# Patient Record
Sex: MALE | Race: BLACK OR AFRICAN AMERICAN | Employment: FULL TIME | URBAN - METROPOLITAN AREA
[De-identification: names, ages, dates, MRNs, and addresses within clinical notes are randomized per-mention and may not be internally consistent; named-entity substitution may affect disease eponyms.]

---

## 2019-09-09 ENCOUNTER — HOSPITAL ENCOUNTER (EMERGENCY)
Age: 29
Discharge: HOME | End: 2019-09-09
Attending: EMERGENCY MEDICINE
Payer: MEDICAID

## 2019-09-09 VITALS
BODY MASS INDEX: 25.29 KG/M2 | SYSTOLIC BLOOD PRESSURE: 172 MMHG | RESPIRATION RATE: 20 BRPM | TEMPERATURE: 98 F | HEART RATE: 78 BPM | OXYGEN SATURATION: 100 % | WEIGHT: 161.5 LBS | DIASTOLIC BLOOD PRESSURE: 82 MMHG

## 2019-09-09 DIAGNOSIS — T50.904A DRUG OVERDOSE, UNDETERMINED INTENT, INITIAL ENCOUNTER: Primary | ICD-10-CM

## 2019-09-09 LAB
ALBUMIN SERPL-MCNC: 4.4 G/DL (ref 3.5–5)
ALBUMIN/GLOB SERPL: 1 {RATIO} (ref 1.1–2.2)
ALP SERPL-CCNC: 81 U/L (ref 45–117)
ALT SERPL-CCNC: 80 U/L (ref 12–78)
ANION GAP SERPL CALC-SCNC: 8 MMOL/L (ref 5–15)
AST SERPL-CCNC: 101 U/L (ref 15–37)
BASOPHILS # BLD: 0 K/UL (ref 0–0.1)
BASOPHILS NFR BLD: 0 % (ref 0–1)
BILIRUB SERPL-MCNC: 0.4 MG/DL (ref 0.2–1)
BUN SERPL-MCNC: 14 MG/DL (ref 6–20)
BUN/CREAT SERPL: 10 (ref 12–20)
CALCIUM SERPL-MCNC: 9.2 MG/DL (ref 8.5–10.1)
CHLORIDE SERPL-SCNC: 101 MMOL/L (ref 97–108)
CO2 SERPL-SCNC: 30 MMOL/L (ref 21–32)
CREAT SERPL-MCNC: 1.41 MG/DL (ref 0.7–1.3)
DIFFERENTIAL METHOD BLD: ABNORMAL
EOSINOPHIL # BLD: 0 K/UL (ref 0–0.4)
EOSINOPHIL NFR BLD: 0 % (ref 0–7)
ERYTHROCYTE [DISTWIDTH] IN BLOOD BY AUTOMATED COUNT: 12.6 % (ref 11.5–14.5)
GLOBULIN SER CALC-MCNC: 4.2 G/DL (ref 2–4)
GLUCOSE SERPL-MCNC: 127 MG/DL (ref 65–100)
HCT VFR BLD AUTO: 42.8 % (ref 36.6–50.3)
HGB BLD-MCNC: 13.8 G/DL (ref 12.1–17)
IMM GRANULOCYTES # BLD AUTO: 0 K/UL (ref 0–0.04)
IMM GRANULOCYTES NFR BLD AUTO: 0 % (ref 0–0.5)
LYMPHOCYTES # BLD: 0.7 K/UL (ref 0.8–3.5)
LYMPHOCYTES NFR BLD: 6 % (ref 12–49)
MCH RBC QN AUTO: 29.6 PG (ref 26–34)
MCHC RBC AUTO-ENTMCNC: 32.2 G/DL (ref 30–36.5)
MCV RBC AUTO: 91.6 FL (ref 80–99)
MONOCYTES # BLD: 0.7 K/UL (ref 0–1)
MONOCYTES NFR BLD: 6 % (ref 5–13)
NEUTS SEG # BLD: 10.4 K/UL (ref 1.8–8)
NEUTS SEG NFR BLD: 88 % (ref 32–75)
NRBC # BLD: 0 K/UL (ref 0–0.01)
NRBC BLD-RTO: 0 PER 100 WBC
PLATELET # BLD AUTO: 172 K/UL (ref 150–400)
PMV BLD AUTO: 11.5 FL (ref 8.9–12.9)
POTASSIUM SERPL-SCNC: 3.6 MMOL/L (ref 3.5–5.1)
PROT SERPL-MCNC: 8.6 G/DL (ref 6.4–8.2)
RBC # BLD AUTO: 4.67 M/UL (ref 4.1–5.7)
RBC MORPH BLD: ABNORMAL
SODIUM SERPL-SCNC: 139 MMOL/L (ref 136–145)
TROPONIN I SERPL-MCNC: <0.05 NG/ML
WBC # BLD AUTO: 11.8 K/UL (ref 4.1–11.1)

## 2019-09-09 PROCEDURE — 99285 EMERGENCY DEPT VISIT HI MDM: CPT

## 2019-09-09 PROCEDURE — 85025 COMPLETE CBC W/AUTO DIFF WBC: CPT

## 2019-09-09 PROCEDURE — 74011250636 HC RX REV CODE- 250/636: Performed by: EMERGENCY MEDICINE

## 2019-09-09 PROCEDURE — 96374 THER/PROPH/DIAG INJ IV PUSH: CPT

## 2019-09-09 PROCEDURE — 84484 ASSAY OF TROPONIN QUANT: CPT

## 2019-09-09 PROCEDURE — 36415 COLL VENOUS BLD VENIPUNCTURE: CPT

## 2019-09-09 PROCEDURE — 80053 COMPREHEN METABOLIC PANEL: CPT

## 2019-09-09 RX ORDER — NALOXONE HYDROCHLORIDE 1 MG/ML
0.2 INJECTION INTRAMUSCULAR; INTRAVENOUS; SUBCUTANEOUS
Status: COMPLETED | OUTPATIENT
Start: 2019-09-09 | End: 2019-09-09

## 2019-09-09 RX ADMIN — NALOXONE HYDROCHLORIDE 0.2 MG: 1 INJECTION PARENTERAL at 18:55

## 2019-09-09 NOTE — ED TRIAGE NOTES
Patient presents via EMS from scene for concerns of drug overdose. Patient was found in his vehicle by his girlfriend unconscious, girlfriend pulled patient out of vehicle and called EMS. [facility] arrived on scene and provided rescue breaths with BVM prior to EMS arrival.   Upon EMS arrival patient arousable to noxious stimuli (responded to inhaled NH3). No Narcan administered en route. On arrival to ED, patient drowsy but opens eyes to tactile stimuli. Pulled several bags of substances out of his sock and attempted to ingest them. [employer] notified and patient disposed of several bags of unknown, white powdery substance and marijuana.

## 2019-09-09 NOTE — ED NOTES
Patient has eyes open spontaneously to verbal stimuli after receiving 0.2 mg Narcan. Oxygen sats 100 on RA.

## 2019-09-09 NOTE — ED PROVIDER NOTES
EMERGENCY DEPARTMENT HISTORY AND PHYSICAL EXAM      Date: 9/9/2019  Patient Name: Bulmaro Martinez    History of Presenting Illness     Chief Complaint   Patient presents with    Drug Overdose       History Provided By: EMS    HPI: Bulmaro Martinez, 26 y.o. male presents with EMS after having been found unconscious and barely breathing by his girlfriend. Patient states upon arrival EMS was able to arouse the patient but has had episodes of not being able to keep his head up and his oxygen saturations continued to drop patient denies any other history and is accompanied by police also. Patient is a poor historian    There are no other complaints, changes, or physical findings at this time. PCP: None    No current facility-administered medications on file prior to encounter. Current Outpatient Medications on File Prior to Encounter   Medication Sig Dispense Refill    ondansetron (ZOFRAN ODT) 4 mg disintegrating tablet Take 1 Tab by mouth every eight (8) hours as needed for Nausea. 10 Tab 0    dicyclomine (BENTYL) 20 mg tablet Take 1 Tab by mouth every six (6) hours as needed (abdominal cramps) for 12 doses. 12 Tab 0    famotidine (PEPCID) 20 mg tablet Take 1 Tab by mouth two (2) times a day. 20 Tab 0       Past History     Past Medical History:  History reviewed. No pertinent past medical history. Past Surgical History:  History reviewed. No pertinent surgical history. Family History:  History reviewed. No pertinent family history. Social History:  Social History     Tobacco Use    Smoking status: Current Every Day Smoker    Smokeless tobacco: Never Used    Tobacco comment: occasional black and mild   Substance Use Topics    Alcohol use:  Yes    Drug use: Yes     Types: Heroin, Cocaine, Opiates       Allergies:  No Known Allergies      Review of Systems   Review of Systems   Unable to perform ROS: Patient nonverbal       Physical Exam   Physical Exam   Constitutional: He appears well-developed and well-nourished. No distress. Extremely sleepy but arousable   HENT:   Head: Normocephalic and atraumatic. Mouth/Throat: Oropharynx is clear and moist. No oropharyngeal exudate. Patient had 2 small baggies of a white powdery substance in his mouth that was subsequently removed and disposed of by  at the bedside. Eyes: Pupils are equal, round, and reactive to light. Conjunctivae and EOM are normal. Right eye exhibits no discharge. Left eye exhibits no discharge. Neck: Normal range of motion. Neck supple. Cardiovascular: Normal rate, regular rhythm and intact distal pulses. Exam reveals no gallop and no friction rub. No murmur heard. Pulmonary/Chest: Breath sounds normal. No respiratory distress. He has no wheezes. He has no rales. He exhibits no tenderness. Decreased respiratory effort   Abdominal: Soft. Bowel sounds are normal. He exhibits no distension and no mass. There is no tenderness. There is no rebound and no guarding. Musculoskeletal: Normal range of motion. He exhibits no edema. Lymphadenopathy:     He has no cervical adenopathy. Neurological: He is alert. No cranial nerve deficit. Coordination normal.   Arousable but has trouble staying awake   Skin: Skin is warm and dry. No rash noted. No erythema. Psychiatric: He has a normal mood and affect. Nursing note and vitals reviewed. Diagnostic Study Results     Labs -   No results found for this or any previous visit (from the past 12 hour(s)). Radiologic Studies -   No orders to display     CT Results  (Last 48 hours)    None        CXR Results  (Last 48 hours)    None          Medical Decision Making   I am the first provider for this patient. I reviewed the vital signs, available nursing notes, past medical history, past surgical history, family history and social history. Vital Signs-Reviewed the patient's vital signs. No data found.     Records Reviewed: Nursing Notes    Provider Notes (Medical Decision Making): Suspect drug overdose and will treat with 0.2 of Narcan at this time    ED Course:   Initial assessment performed. The patients presenting problems have been discussed, and they are in agreement with the care plan formulated and outlined with them. I have encouraged them to ask questions as they arise throughout their visit. 10:10 PM  Patient is awake alert without oxygen support would like to be discharged  Critical Care Time:   CRITICAL CARE NOTE :    10:11 PM    IMPENDING DETERIORATION -Respiratory and CNS  ASSOCIATED RISK FACTORS - Hypoxia  MANAGEMENT- Bedside Assessment  INTERPRETATION -  ECG and Cardiac Output Measures   INTERVENTIONS - Metobolic interventions  CASE REVIEW - Nursing and Family  TREATMENT RESPONSE -Improved and Resolved  PERFORMED BY - Self    NOTES   :  I have spent 45 minutes of critical care time involved in lab review, consultations with specialist, family decision- making, bedside attention and documentation. During this entire length of time I was immediately available to the patient . Sylvia Swanson MD    Disposition:  home    PLAN:  1. Discharge Medication List as of 9/9/2019 10:15 PM      START taking these medications    Details   naloxone (NARCAN) 2 mg/actuation spry Use 1 spray intranasally, then discard. Repeat with new spray every 2 min as needed for opioid overdose symptoms, alternating nostrils. , Normal, Disp-2 Applicatorful, R-0         CONTINUE these medications which have NOT CHANGED    Details   ondansetron (ZOFRAN ODT) 4 mg disintegrating tablet Take 1 Tab by mouth every eight (8) hours as needed for Nausea. Print, 4 mg, Disp-10 Tab, R-0      dicyclomine (BENTYL) 20 mg tablet Take 1 Tab by mouth every six (6) hours as needed (abdominal cramps) for 12 doses. Print, 20 mg, Disp-12 Tab, R-0      famotidine (PEPCID) 20 mg tablet Take 1 Tab by mouth two (2) times a day. Print, 20 mg, Disp-20 Tab, R-0           2.    Follow-up Information Follow up With Specialties Details Why [address] [facility] EMERGENCY DEPT Emergency Medicine Call As needed, If symptoms worsen Ash Casanova  [phone]        Return to ED if worse     Diagnosis     Clinical Impression:   1. Drug overdose, undetermined intent, initial encounter        Attestations:    Kalie Reed MD    Please note that this dictation was completed with [employer], the computer voice recognition software. Quite often unanticipated grammatical, syntax, homophones, and other interpretive errors are inadvertently transcribed by the computer software. Please disregard these errors. Please excuse any errors that have escaped final proofreading. Thank you.

## 2019-09-09 NOTE — ED NOTES
Bedside and Verbal shift change report given to Babs Viramontes RN (oncoming nurse) by Amy Quinn RN (offgoing nurse). Report included the following information SBAR.

## 2019-09-10 NOTE — DISCHARGE INSTRUCTIONS
Patient Education        Opioid Overdose: Care Instructions  Your Care Instructions    You have had treatment to help your body recover from taking too much of an opioid. You are getting better, but you may not feel well for a while. It takes time for the opioids to leave your body. How long it takes to feel better depends on which drug you took and how much you took of it. Opioids include illegal drugs such as heroin, often called smack, junk, H, and ska. Opioids also include medicines that doctors prescribe to treat pain. These are medicines such as oxycodone, methadone, and buprenorphine. They are sometimes sold and used illegally. Taking too much of an opioid can be dangerous. It may cause:  · Trouble breathing. · Low blood pressure. · A low heart rate. · A coma. When the doctor treated you for the overdose, he or she may have:  · Watched your symptoms or done tests to find out what kind of drug you took. · Given you fluids. · Given you oxygen to help you breathe. · Given you a medicine called naloxone to help reverse the effects of the opioid. · Done several tests, including blood tests, to see how you're responding to treatment. The doctor also watched you carefully to make sure you were recovering safely. Follow-up care is a key part of your treatment and safety. Be sure to make and go to all appointments, and call your doctor if you are having problems. It's also a good idea to know your test results and keep a list of the medicines you take. How can you care for yourself at home? · If you take opioids regularly, your body gets used to them. This is called physical dependence. If you are physically dependent on opioids, you may have withdrawal symptoms when you stop using them or use less. These symptoms can include nausea, sweating, chills, diarrhea, stomach cramps, and muscle aches. Withdrawal can last up to several weeks, depending on which opioid you took and how long you took it.  You may feel very ill, but you probably aren't in medical danger. · Your doctor may give you medicine to help you feel better. To help you get through withdrawal, you can also:  ? Get plenty of rest.  ? Drink plenty of fluids. ? Stay active. ? Eat a healthy diet. · If you had a tube in your throat to help you breathe, you may have a sore throat or hoarseness that can last a few days. Sip liquids to help soothe your throat. · Do not drink alcohol or take illegal drugs. · Do not take medicines that make you tired, like sleeping pills or muscle relaxers. · Do not drive if you feel sleepy or groggy while you recover from an overdose. · Get help to stop using opioids. Talk to your doctor about substance use treatment programs. · Talk to your doctor or pharmacist about having a naloxone rescue kit on hand. When should you call for help? Call 911 anytime you think you may need emergency care. For example, call if:    · You feel you cannot stop from hurting yourself or someone else.   [facility] your doctor now or seek immediate medical care if:    · You have new or worse withdrawal symptoms, such as:  ? Stomach cramps. ? Vomiting. ? Diarrhea. ? Muscle aches. ? Sweating.    Watch closely for changes in your health, and be sure to contact your doctor if:    · You do not get better as expected. Where can you learn more? Go to http://jose-marianne.info/. Enter 634 [id number] 022 in the search box to learn more about \"Opioid Overdose: Care Instructions. \"  Current as of: February 5, 2019  Content Version: 12.1  © 1816-3382 [employer]. Care instructions adapted under license by [employer] (which disclaims liability or warranty for this information). If you have questions about a medical condition or this instruction, always ask your healthcare professional. [address] any warranty or liability for your use of this information.

## 2019-09-10 NOTE — ED NOTES
at bedside reviewing patient's discharge instructions and reviewing medications. Patient ambulatory home with girlfriend. Patient in no apparent distress.   Unable to reassess pain

## 2019-09-22 ENCOUNTER — HOSPITAL ENCOUNTER (EMERGENCY)
Age: 29
Discharge: HOME | End: 2019-09-22
Attending: EMERGENCY MEDICINE
Payer: MEDICAID

## 2019-09-22 VITALS
BODY MASS INDEX: 30.23 KG/M2 | WEIGHT: 193 LBS | OXYGEN SATURATION: 100 % | SYSTOLIC BLOOD PRESSURE: 125 MMHG | HEART RATE: 107 BPM | RESPIRATION RATE: 16 BRPM | DIASTOLIC BLOOD PRESSURE: 49 MMHG | TEMPERATURE: 97.6 F

## 2019-09-22 DIAGNOSIS — T40.604A NARCOTIC OVERDOSE, UNDETERMINED INTENT, INITIAL ENCOUNTER (HCC): Primary | ICD-10-CM

## 2019-09-22 PROCEDURE — 99285 EMERGENCY DEPT VISIT HI MDM: CPT

## 2019-09-22 NOTE — ED NOTES
Pt continues to rest quietly in bed in position of comfort. Updated on plan of care. Call bell within reach.    Pt is sleeping

## 2019-09-22 NOTE — ED NOTES
Pt arrived to ED via [place] ems with c/o pt was found apnea and was being ventilation assistance by pd when ems arrived on scene. IV access was obtained and pt was given a total of 2 mg of narcan at which time pt responded positively and began breathing on his own. Upon arrival to er pt was axox4. Pupils constricted but reactive and pt denies any opiod use. Pt is in no acute distress. Will continue to monitor. See nursing assessment. Safety precautions in place; call light within reach. Emergency Department Nursing Plan of Care       The Nursing Plan of Care is developed from the Nursing assessment and Emergency Department Attending provider initial evaluation. The plan of care may be reviewed in the ED Provider note.     The Plan of Care was developed with the following considerations:   Patient / Family readiness to learn indicated by:verbalized understanding  Persons(s) to be included in education: patient  Barriers to Learning/Limitations:No    Signed     Ted aMrtinez RN    9/22/2019   3:41 AM

## 2019-09-22 NOTE — DISCHARGE INSTRUCTIONS
Patient Education   Substance Abuse and Addiction Resources    [address] [facility]  [phone]  · Closed meeting- family members that have been affected bysomeone alcohol abuse  · Open meeting everyone can attend. Alcoholic's Anonymous [phone]  · Non professional (alcoholics in recovery helping others)  · Hold Meetings (talk about sobriety, have desire)  · No charge    [facility] [phone]  · Aby Stephens is the Treatment Consultant in the [facility] area  · Free one-on-one phone consultation and insurance verification  · 12 step based meetings and philosophy  · Family programs and sessions    [facility] [phone]  · Free individual assessment  · Intensive outpatient outpatient program  · Ambulatory withdrawal management/detoxification  · Insurance required    [facility] [phone] ext [phone] or [phone]  · Good for patients with no insurance, Medicaid, Medicare  · Sliding fee scale available for self pay  · Patients go Monday-Friday from 8-4:30 to central intake for a shelter and then to [facility] for services  · Offers a variety of services I.e. Laundry, shower, eye clinic, medical clinic, dental, substance abuse services, case management, etc.    [facility] [phone]  · Make appointment with counselor  · $77 fee for initial hour intake    [address] [phone]  · Offers Detox and Inpatient Treatment for men only. Social detox  · Is a homeless shelter with longterm 12 step program. Can choose just access to the hshelter component  · Must be able to walk <1miles one way to attend classes, be highly motivated and willing to complete all 12 steps. 8 months- 1 year is the typical length of stay if accepted    [facility] [place] [phone]  · For residents of [facility]  · They offer outpatient treatment and can make referrals for inpatient careBased on income. · Will bill insurance.  Accept Medicaid. · They have a walk in clinic that patients can go to be screened for services. Two on the [facility] and [facility] side of [facility]:  ·  [address] (near [facility]). Walk in hours: Mon or Wed       12:30pm - 3pm  ·  Ascenc[facility] Betancourt. Walk in hours: Tuesday 12:30pm 3pm Wed       8:30am- 11am    [facility] [place]  · $3500 entry fee  · 12 step meeting plan  · No sex offenders accepted. · Must get a job within 30 days after admission  · After the 12 week, additional $125/week to stay    Narcotic Anonymous [phone], [phone]  · Will refer to state funded facility    [facility]  · Walk into Triage (takes 10-15 minutes)  · Proof of [facility] residence with Picture ID  · Medicaid and Self Pay. NO Private insurance    [facility] [phone]  · Referrals come from organizations like [employer] [facility], [employer], [facility]. · Must be self pay. No insurance allowed. · No patients on prescribed narcotics. No sex offenders. · Need all medical mental health information and medication list sent prior to admit. [facility] [phone] ext [phone]  · [address] [facility] Ext between 21-65  · Need social security card and picture ID  · Must pass breath test and 10 panel Urine Drug Screen  · No Sex Offenders or Psychiatric patients  · Must not have been a 3x repeat at this facility  · 6 month in house- has to participate in work therapy 40 hours/week  · Participates in spiritual classes, bible study and Jehovah's witness    [place] [phone]  · Private Pay, sent by [employer]  · No Sex Offenders              [facility] (by [address])    [facility] Located within [facility]  [phone]  · Monday through Friday 8:30am to 5:00pm  · Brief Telephone Interview. Then will be scheduled for next substance abuse services orientation group and then scheduled for intake interview.     [facility]  [phone]  · Walk in Monday through Friday 9:00AM to 3:00PM  · Bring Picture ID, Proof of residence (piece of mail), Proof of Insurance ([address] scale), Proof of income (any)    [facility] [phone]  · Walk in then Assessment by licensed professional   · [place] office ([address] [facility]) Monday, Tuesday, Thursday  9AM to 3PM.   · [facility] office ([address] [place] [address], [address] [facility]) [facility] [phone]  · Walk In Monday to Friday starting at [address] [address] [address]  · Bring Picture ID, Proof of residence (piece of mail), Proof of insurance (Medicaid/sliding scale)  · At 9AM is the Substance Abuse Services Orientation Group and then scheduled for Intake Evaluation. Learning About Opioid Use Disorder  What is opioid use disorder? Opioid use disorder means that a person uses opioids even though it causes harm to themselves or others. It can range from mild to severe. The more signs of this disorder you have, the more severe it may be. Moderate to severe opioid use disorder is sometimes called addiction. People who have it may find it hard to control their use. This disorder can develop from the use of any type of opioid. Prescription ones include hydrocodone, oxycodone, fentanyl, and morphine. Heroin is an example of an illegal opioid. Opioids can be dangerous. Taking too much can cause:  · Trouble breathing. · Low blood pressure. · A low heart rate. · A coma. · Death. Many people with this disorder, and sometimes their families, feel embarrassed or ashamed. Don't let these feelings  the way of getting treatment. Remember that this disorder can happen to anyone who uses opioids, no matter what the reason. What are the signs? You may have opioid use disorder if two or more of the following are true. The more signs of this disorder you have, the more severe it may be. · You use larger amounts of opioids than you ever meant to.  Or you've been using them for a longer period of time than you ever meant to. · You can't cut down or control your use. Or you constantly wish you could cut down. · You spend a lot of time getting or using opioids or recovering from the effects. · You have strong cravings for opioids. · You can no longer do your main jobs at work, at school, or at home. · You keep using, even though your opioid use hurts your relationships. · You have stopped doing important activities because of your opioid use. · You use opioids in situations where doing so is dangerous. · You keep using, even though you know it is causing health problems. · You need more and more of the opioids to get the same effect, or you get less effect from the same amount over time. This is called tolerance. · You have uncomfortable symptoms when you stop using opioids or use less. This is called withdrawal.  If you're taking opioids as part of a supervised care plan, tolerance and withdrawal may not mean that you have opioid use disorder. How is opioid use disorder treated? Treatment usually includes medicines, group therapy, one or more types of counseling, and education. Medicines may be used to help you quit. They may help to control cravings, ease withdrawal symptoms, and prevent relapse. This treatment is called medication-assisted treatment, or MAT. During MAT, you take a medicine (usually methadone or buprenorphine) in place of the opioid you were using. Most people take the medicine for months or years as a part of the treatment, along with therapy or counseling. Treatment focuses on more than opioid use. It helps you cope with the anger, frustration, sadness, and disappointment that often happen when a person tries to stop using opioids. Treatment also looks at other parts of your life, like your relationships with friends and family, school and work, medical problems, and living situation. Treatment helps you find and manage problems.  It helps you take control of your life so you don't have to depend on opioids. Opioid use disorder affects your whole family. Family counseling often is part of treatment. Urgent treatment for an overdose  Naloxone is a medicine that reverses the effects of an opioid overdose. If you take it or someone gives it to you soon enough after an overdose, it can save your life. Naloxone comes in a rescue kit you can carry with you. Ask your doctor or pharmacist about having a naloxone rescue kit on hand. Follow-up care is a key part of your treatment and safety. Be sure to make and go to all appointments, and call your doctor if you are having problems. It's also a good idea to know your test results and keep a list of the medicines you take. Where can you learn more? Go to http://jose-marianne.info/. Enter B693 in the search box to learn more about \"Learning About Opioid Use Disorder. \"  Current as of: February 5, 2019  Content Version: 12.2  © 6382-5696 [employer]. Care instructions adapted under license by [employer] (which disclaims liability or warranty for this information). If you have questions about a medical condition or this instruction, always ask your healthcare professional. [place] [address] any warranty or liability for your use of this information. Patient Education        Learning About Naloxone for Opioid Overdose  What is naloxone? Opioids are strong pain medicines. Examples include hydrocodone, oxycodone, fentanyl, and morphine. Heroin is an example of an illegal opioid. Taking too much of an opioid can cause death. An overdose is an emergency. Naloxone is a medicine that reverses the effects of an overdose. If you take it soon enough after an overdose, it can save your life. Naloxone comes in a rescue kit you can carry with you. You may hear it called a Narcan kit for short. The rescue kit may contain:  · The medicine. · Syringes and needles. · A nasal adapter for the syringes.   · A separate nasal spray device. Your doctor can give you a prescription for a rescue kit and show you how to use it. In some places you can buy Narcan kits without a prescription. When is naloxone used? Naloxone is used when a person shows signs of an opioid overdose. A person may have overdosed if he or she is:  · Sleepy or hard to wake up. · Confused. · Not breathing normally. Make sure your family and friends know about these signs of an overdose. If someone appears to have overdosed, call 911. A drug overdose is an emergency. How do you use it? If you overdose, you may not be able to give yourself the medicine. So it's very important that your friends and family know how and when to give it to you. Rescue kits come with instructions. There are two ways to give the medicine. Many rescue kits can be used for either method. The methods are:  · Injection with needle and syringe. ? Training may be needed in order to use this method correctly. ? Some rescue kits come with automatic injectors that don't require special training. ? The medicine can be injected through clothing. · Nasal spray. ? Many rescue kits come with a nasal adapter. It attaches to the syringe and turns the medicine into a mist.  ? The mist is sprayed into the nose of a person who has overdosed. The person needs to be lying down when the mist is sprayed. Overdose symptoms may return a few minutes after the first dose from the rescue kit. If that happens, a second dose is needed. Rescue kits come with two doses for that reason. Keep your rescue kit with you always. You never know when you might need it. If you think you or someone else may have overdosed but you're not sure, use the kit anyway. Aside from going through withdrawal, which may be uncomfortable, there is no downside to using this medicine. Always go to the emergency room after using naloxone. Doctors will want to make sure the overdose has been reversed.   Follow-up care is a key part of your treatment and safety. Be sure to make and go to all appointments, and call your doctor if you are having problems. It's also a good idea to know your test results and keep a list of the medicines you take. Where can you learn more? Go to http://jose-marianne.info/. Enter C631 in the search box to learn more about \"Learning About Naloxone for Opioid Overdose. \"  Current as of: February 5, 2019  Content Version: 12.2  © 7809-3040 for; [employer]. Care instructions adapted under license by [employer] (which disclaims liability or warranty for this information). If you have questions about a medical condition or this instruction, always ask your healthcare professional. [address] any warranty or liability for your use of this information.

## 2019-09-22 NOTE — ED PROVIDER NOTES
EMERGENCY DEPARTMENT HISTORY AND PHYSICAL EXAM      Date: 9/22/2019  Patient Name: Joseph Lau    History of Presenting Illness     Chief Complaint   Patient presents with    Drug Overdose     History Provided By: Patient and EMS    HPI: Joseph Lau, 26 y.o. male with no known medical history who presents via EMS to the ED with cc of agonal respirations and decreased responsiveness that occurred approximately 20 minutes prior to arrival.  Patient's girlfriend states that he got home from work and took a shower and was laying on the bed when she noticed that he was not talking to her anymore. She states that his eyes were rolled back in his head and he had very slow breaths where he was sounded like he was choking. She tried slapping him in his face multiple times to try to wake him up but he would not respond. She then called EMS. On scene, EMS established an IV and notices agonal respirations as well. They gave patient 0.25mg of IV Narcan at which time patient woke up and guarded responding. On arrival to the emergency department, patient is answering questions appropriately and protecting his own airway. He denies any narcotic use and he is unsure how he got in the back of the ambulance. PMHx: None  Social Hx: Occasionally smokes Black and Milds, occasional alcohol use, history of cocaine and opioid use per chart review (pt denies current use)    PCP: None    There are no other complaints, changes, or physical findings at this time. No current facility-administered medications on file prior to encounter. Current Outpatient Medications on File Prior to Encounter   Medication Sig Dispense Refill    ondansetron (ZOFRAN ODT) 4 mg disintegrating tablet Take 1 Tab by mouth every eight (8) hours as needed for Nausea. 10 Tab 0    dicyclomine (BENTYL) 20 mg tablet Take 1 Tab by mouth every six (6) hours as needed (abdominal cramps) for 12 doses.  12 Tab 0    famotidine (PEPCID) 20 mg tablet Take 1 Tab by mouth two (2) times a day. 20 Tab 0     Past History     Past Medical History:  History reviewed. No pertinent past medical history. Past Surgical History:  History reviewed. No pertinent surgical history. Family History:  History reviewed. No pertinent family history. Social History:  Social History     Tobacco Use    Smoking status: Current Every Day Smoker    Smokeless tobacco: Never Used    Tobacco comment: occasional black and mild   Substance Use Topics    Alcohol use: Yes    Drug use: Yes     Types: Heroin, Cocaine, Opiates     Allergies:  No Known Allergies  Review of Systems   Review of Systems   Constitutional: Negative for chills and fever. HENT: Negative for congestion, rhinorrhea, sneezing and sore throat. Eyes: Negative for redness and visual disturbance. Respiratory: Negative for shortness of breath. Cardiovascular: Negative for chest pain and leg swelling. Gastrointestinal: Negative for abdominal pain, nausea and vomiting. Genitourinary: Negative for difficulty urinating and frequency. Musculoskeletal: Negative for back pain, myalgias and neck stiffness. Skin: Negative for rash. Neurological: Negative for dizziness, syncope, weakness and headaches. Hematological: Negative for adenopathy. All other systems reviewed and are negative. Physical Exam   Physical Exam   Constitutional: He is oriented to person, place, and time. He appears well-developed and well-nourished. HENT:   Head: Normocephalic and atraumatic. Mouth/Throat: Oropharynx is clear and moist and mucous membranes are normal.   Eyes: EOM are normal.   Neck: Normal range of motion and full passive range of motion without pain. Neck supple. Cardiovascular: Normal rate, regular rhythm, normal heart sounds, intact distal pulses and normal pulses. No murmur heard. Pulmonary/Chest: Effort normal and breath sounds normal. No respiratory distress. He exhibits no tenderness. Abdominal: Soft. Normal appearance and bowel sounds are normal. There is no tenderness. There is no rebound and no guarding. Neurological: He is alert and oriented to person, place, and time. He has normal strength. Skin: Skin is warm, dry and intact. No rash noted. No erythema. Psychiatric: He has a normal mood and affect. His speech is normal and behavior is normal. Judgment and thought content normal.   Nursing note and vitals reviewed. Diagnostic Study Results   Labs -   No results found for this or any previous visit (from the past 12 hour(s)). Radiologic Studies -   No orders to display     No results found. Medical Decision Making   I am the first provider for this patient. I reviewed the vital signs, available nursing notes, past medical history, past surgical history, family history and social history. Vital Signs-Reviewed the patient's vital signs. Patient Vitals for the past 12 hrs:   Temp Pulse Resp BP SpO2   09/22/19 0437  96   98 %   09/22/19 0430  92  134/63 100 %   09/22/19 0400  97  127/61 99 %   09/22/19 0354  96  132/63 100 %   09/22/19 0324    129/79    09/22/19 0322 97.6 °F (36.4 °C) 90 16  100 %     Pulse Oximetry Analysis - 98% on RA    Cardiac Monitor:   Rate: 96 bpm  Rhythm: Normal Sinus Rhythm     Records Reviewed: Nursing Notes and Old Medical Records    Provider Notes (Medical Decision Making):   79-year-old male presents via EMS with unresponsive episode and agonal respirations. On arrival, he is awake, alert, protecting his airway, and answering questions appropriately. He received 0.25 mg of IV Narcan from EMS. He denies taking any illegal drugs or narcotics. Upon chart review, he had a similar episode less than 2 weeks ago. Will observe patient on cardiac monitor for 2 hours until the Narcan is out of his system and if he is maintaining his airway and back to his baseline, will discharge. ED Course:   Initial assessment performed.  The patients presenting problems have been discussed, and they are in agreement with the care plan formulated and outlined with them. I have encouraged them to ask questions as they arise throughout their visit. Progress Note  4:49 AM  I have re-evaluated pt and he is awake and alert. He is still denying any opioid use today. I did discuss with him that he has a high risk for overdose in the future and if no one finds them, this could lead to death. He did not get the Narcan prescription filled last time. I strongly encouraged him to get it filled on discharge tonight. Patient's girlfriend is in the room who was witnessed to this discussion. CRITICAL CARE NOTE :    4:50 AM    IMPENDING DETERIORATION -Airway, Respiratory and CNS    ASSOCIATED RISK FACTORS - Hypoxia and CNS Decompensation    MANAGEMENT- Bedside Assessment and Supervision of Care    INTERPRETATION -  Blood Pressure and oxygen saturation    CASE REVIEW - Nursing and Family    TREATMENT RESPONSE -Improved    PERFORMED BY - Self    NOTES   :    I have spent 45 minutes of critical care time involved in lab review, consultations with specialist, family decision- making, bedside attention and documentation. During this entire length of time I was immediately available to the patient. Critical Care: The reason for providing this level of medical care for this critically ill patient was due to a critical illness that impaired one or more vital organ systems such that there was a high probability of imminent or life threatening deterioration in the patients condition. This care involved high complexity decision making to assess, manipulate, and support vital system functions. Pennie Bradford MD    Progress Note:   Updated pt on all returned results and findings. Discussed the importance of proper follow up as referred below along with return precautions.  Pt in agreement with the care plan and expresses agreement with and understanding of all items discussed. Disposition:  Discharge Note:  The pt is ready for discharge. The pt's signs, symptoms, diagnosis, and discharge instructions have been discussed and pt has conveyed their understanding. The pt is to follow up as recommended or return to ER should their symptoms worsen. Plan has been discussed and pt is in agreement. PLAN:  1. Current Discharge Medication List      CONTINUE these medications which have CHANGED    Details   naloxone (NARCAN) 2 mg/actuation spry Use 1 spray intranasally, then discard. Repeat with new spray every 2 min as needed for opioid overdose symptoms, alternating nostrils. Qty: 2 Applicatorful, Refills: 0           2. Follow-up Information     Follow up With Specialties Details Why Contact Info    [facility]  Schedule an appointment as soon as possible for a visit  [address] [place] [address]    [address] [facility] Internal Medicine Call to arrange primary care [address]    [address]  Call to arrange primary care [facility]  [phone]    [facility] EMERGENCY DEPT Emergency Medicine  As needed, If symptoms worsen [facility]  [phone]        Return to ED if worse     Diagnosis     Clinical Impression:   1. Narcotic overdose, undetermined intent, initial encounter [facility])            Please note that this dictation was completed with Dragon, computer voice recognition software. Quite often unanticipated grammatical, syntax, homophones, and other interpretive errors are inadvertently transcribed by the computer software. Please disregard these errors. Additionally, please excuse any errors that have escaped final proofreading.

## 2019-09-22 NOTE — ED TRIAGE NOTES
Pt comes in via EMS. Per EMS pt's gf called EMS at 0230 saying pt wasn't breathing. On arrival of EMS pt BMV and given a total of 2 mg narcan and pt is breathing and a and O x 4 now. Pt denies drug use and says he has been drinking ETOH.

## 2025-07-01 NOTE — ED NOTES
Patient  given copy of dc instructions and 1 script(s). Patient verbalized understanding of instructions and script (s). Patient given a current medication reconciliation form and verbalized understanding of their medications. Patient verbalized understanding of the importance of discussing medications with  his or her physician or clinic they will be following up with. Patient alert and oriented and in no acute distress. Patient discharged home ambulatory. [Follow - Up] : a follow-up visit [DM Type 2] : DM Type 2